# Patient Record
Sex: MALE | Race: WHITE
[De-identification: names, ages, dates, MRNs, and addresses within clinical notes are randomized per-mention and may not be internally consistent; named-entity substitution may affect disease eponyms.]

---

## 2017-03-17 ENCOUNTER — HOSPITAL ENCOUNTER (OUTPATIENT)
Age: 62
Discharge: HOME | End: 2017-03-17
Payer: OTHER GOVERNMENT

## 2017-03-17 DIAGNOSIS — H34.211: Primary | ICD-10-CM

## 2018-02-27 ENCOUNTER — HOSPITAL ENCOUNTER (OUTPATIENT)
Dept: HOSPITAL 76 - LAB.N | Age: 63
Discharge: HOME | End: 2018-02-27
Attending: INTERNAL MEDICINE
Payer: OTHER GOVERNMENT

## 2018-02-27 DIAGNOSIS — H40.9: ICD-10-CM

## 2018-02-27 DIAGNOSIS — E11.40: ICD-10-CM

## 2018-02-27 DIAGNOSIS — I10: ICD-10-CM

## 2018-02-27 DIAGNOSIS — K59.00: ICD-10-CM

## 2018-02-27 DIAGNOSIS — M19.90: ICD-10-CM

## 2018-02-27 DIAGNOSIS — Z13.6: ICD-10-CM

## 2018-02-27 DIAGNOSIS — Z00.00: Primary | ICD-10-CM

## 2018-02-27 DIAGNOSIS — Z79.899: ICD-10-CM

## 2018-02-27 LAB
ALBUMIN DIAFP-MCNC: 3.7 G/DL (ref 3.2–5.5)
ALBUMIN/GLOB SERPL: 1.1 {RATIO} (ref 1–2.2)
ALP SERPL-CCNC: 65 IU/L (ref 42–121)
ALT SERPL W P-5'-P-CCNC: 18 IU/L (ref 10–60)
ANION GAP SERPL CALCULATED.4IONS-SCNC: 9 MMOL/L (ref 6–13)
AST SERPL W P-5'-P-CCNC: 22 IU/L (ref 10–42)
BASOPHILS NFR BLD AUTO: 0.2 10^3/UL (ref 0–0.1)
BASOPHILS NFR BLD AUTO: 1.8 %
BILIRUB BLD-MCNC: 1 MG/DL (ref 0.2–1)
BUN SERPL-MCNC: 18 MG/DL (ref 6–20)
CALCIUM UR-MCNC: 9.2 MG/DL (ref 8.5–10.3)
CHLORIDE SERPL-SCNC: 102 MMOL/L (ref 101–111)
CHOLEST SERPL-MCNC: 222 MG/DL
CLARITY UR REFRACT.AUTO: CLEAR
CO2 SERPL-SCNC: 23 MMOL/L (ref 21–32)
CREAT SERPLBLD-SCNC: 1.1 MG/DL (ref 0.6–1.2)
CREAT UR-SCNC: 167 MG/DL
EOSINOPHIL # BLD AUTO: 0.3 10^3/UL (ref 0–0.7)
EOSINOPHIL NFR BLD AUTO: 3 %
ERYTHROCYTE [DISTWIDTH] IN BLOOD BY AUTOMATED COUNT: 13.5 % (ref 12–15)
EST. AVERAGE GLUCOSE BLD GHB EST-MCNC: 151 MG/DL (ref 70–100)
GFRSERPLBLD MDRD-ARVRAT: 67 ML/MIN/{1.73_M2} (ref 89–?)
GLOBULIN SER-MCNC: 3.4 G/DL (ref 2.1–4.2)
GLUCOSE SERPL-MCNC: 279 MG/DL (ref 70–100)
GLUCOSE UR QL STRIP.AUTO: >=1000 MG/DL
HB2 TOTAL: 17.9 G/DL
HBA1C BLD-MCNC: 0.92 G/DL
HDLC SERPL-MCNC: 37 MG/DL
HDLC SERPL: 6 {RATIO} (ref ?–5)
HEMOGLOBIN A1C %: 6.9 % (ref 4.6–6.2)
HGB UR QL STRIP: 15.7 G/DL (ref 14–18)
KETONES UR QL STRIP.AUTO: NEGATIVE MG/DL
LDLC SERPL CALC-MCNC: 163 MG/DL
LDLC/HDLC SERPL: 4.4 {RATIO} (ref ?–3.6)
LYMPHOCYTES # SPEC AUTO: 2.5 10^3/UL (ref 1.5–3.5)
LYMPHOCYTES NFR BLD AUTO: 26.1 %
MCH RBC QN AUTO: 28.7 PG (ref 27–31)
MCHC RBC AUTO-ENTMCNC: 33.8 G/DL (ref 32–36)
MCV RBC AUTO: 84.9 FL (ref 80–94)
MICROALBUMIN UR-MCNC: 0.3 MG/DL (ref 0–300)
MICROALBUMIN/CREAT RATIO PNL UR: 1.8 UG/MG (ref ?–30)
MONOCYTES # BLD AUTO: 0.7 10^3/UL (ref 0–1)
MONOCYTES NFR BLD AUTO: 6.9 %
NEUTROPHILS # BLD AUTO: 6 10^3/UL (ref 1.5–6.6)
NEUTROPHILS # SNV AUTO: 9.7 X10^3/UL (ref 4.8–10.8)
NEUTROPHILS NFR BLD AUTO: 62.2 %
NITRITE UR QL STRIP.AUTO: NEGATIVE
PDW BLD AUTO: 9.2 FL (ref 7.4–11.4)
PH UR STRIP.AUTO: 6 PH (ref 5–7.5)
PLATELET # BLD: 244 10^3/UL (ref 130–450)
PROT SPEC-MCNC: 7.1 G/DL (ref 6.7–8.2)
PROT UR STRIP.AUTO-MCNC: NEGATIVE MG/DL
RBC # UR STRIP.AUTO: NEGATIVE /UL
RBC MAR: 5.46 10^6/UL (ref 4.7–6.1)
SODIUM SERPLBLD-SCNC: 134 MMOL/L (ref 135–145)
SP GR UR STRIP.AUTO: 1.02 (ref 1–1.03)
TSH SERPL-ACNC: 2.43 UIU/ML (ref 0.34–5.6)
UROBILINOGEN UR QL STRIP.AUTO: (no result) E.U./DL
UROBILINOGEN UR STRIP.AUTO-MCNC: NEGATIVE MG/DL
VIT B12 SERPL-MCNC: 546 PG/ML (ref 180–914)
VLDLC SERPL-SCNC: 22 MG/DL

## 2018-02-27 PROCEDURE — 81001 URINALYSIS AUTO W/SCOPE: CPT

## 2018-02-27 PROCEDURE — 82607 VITAMIN B-12: CPT

## 2018-02-27 PROCEDURE — 80061 LIPID PANEL: CPT

## 2018-02-27 PROCEDURE — 83036 HEMOGLOBIN GLYCOSYLATED A1C: CPT

## 2018-02-27 PROCEDURE — 82043 UR ALBUMIN QUANTITATIVE: CPT

## 2018-02-27 PROCEDURE — 36415 COLL VENOUS BLD VENIPUNCTURE: CPT

## 2018-02-27 PROCEDURE — 80053 COMPREHEN METABOLIC PANEL: CPT

## 2018-02-27 PROCEDURE — 81003 URINALYSIS AUTO W/O SCOPE: CPT

## 2018-02-27 PROCEDURE — 83721 ASSAY OF BLOOD LIPOPROTEIN: CPT

## 2018-02-27 PROCEDURE — 84443 ASSAY THYROID STIM HORMONE: CPT

## 2018-02-27 PROCEDURE — 86803 HEPATITIS C AB TEST: CPT

## 2018-02-27 PROCEDURE — 82570 ASSAY OF URINE CREATININE: CPT

## 2018-02-27 PROCEDURE — 85025 COMPLETE CBC W/AUTO DIFF WBC: CPT

## 2018-02-27 PROCEDURE — 87086 URINE CULTURE/COLONY COUNT: CPT

## 2018-02-28 LAB
HEPATITIS C ANTIBODY: (no result)
SIGNAL TO CUT-OFF: 0 (ref ?–1)

## 2019-04-09 ENCOUNTER — HOSPITAL ENCOUNTER (OUTPATIENT)
Dept: HOSPITAL 76 - LAB.N | Age: 64
Discharge: HOME | End: 2019-04-09
Attending: INTERNAL MEDICINE
Payer: OTHER GOVERNMENT

## 2019-04-09 DIAGNOSIS — C85.90: ICD-10-CM

## 2019-04-09 DIAGNOSIS — I10: ICD-10-CM

## 2019-04-09 DIAGNOSIS — G62.9: ICD-10-CM

## 2019-04-09 DIAGNOSIS — H40.9: ICD-10-CM

## 2019-04-09 DIAGNOSIS — Z13.6: ICD-10-CM

## 2019-04-09 DIAGNOSIS — M19.90: ICD-10-CM

## 2019-04-09 DIAGNOSIS — H91.90: ICD-10-CM

## 2019-04-09 DIAGNOSIS — E11.9: ICD-10-CM

## 2019-04-09 DIAGNOSIS — Z79.899: Primary | ICD-10-CM

## 2019-04-09 LAB
ALBUMIN DIAFP-MCNC: 3.9 G/DL (ref 3.2–5.5)
ALBUMIN/GLOB SERPL: 1.2 {RATIO} (ref 1–2.2)
ALP SERPL-CCNC: 78 IU/L (ref 42–121)
ALT SERPL W P-5'-P-CCNC: 17 IU/L (ref 10–60)
ANION GAP SERPL CALCULATED.4IONS-SCNC: 8 MMOL/L (ref 6–13)
AST SERPL W P-5'-P-CCNC: 20 IU/L (ref 10–42)
BASOPHILS NFR BLD AUTO: 0.1 10^3/UL (ref 0–0.1)
BASOPHILS NFR BLD AUTO: 1 %
BILIRUB BLD-MCNC: 0.7 MG/DL (ref 0.2–1)
BUN SERPL-MCNC: 17 MG/DL (ref 6–20)
CALCIUM UR-MCNC: 10.1 MG/DL (ref 8.5–10.3)
CHLORIDE SERPL-SCNC: 103 MMOL/L (ref 101–111)
CHOLEST SERPL-MCNC: 205 MG/DL
CLARITY UR REFRACT.AUTO: CLEAR
CO2 SERPL-SCNC: 26 MMOL/L (ref 21–32)
CREAT SERPLBLD-SCNC: 0.8 MG/DL (ref 0.6–1.2)
CREAT UR-SCNC: 135.5 MG/DL
EOSINOPHIL # BLD AUTO: 0.2 10^3/UL (ref 0–0.7)
EOSINOPHIL NFR BLD AUTO: 1.6 %
ERYTHROCYTE [DISTWIDTH] IN BLOOD BY AUTOMATED COUNT: 13.4 % (ref 12–15)
EST. AVERAGE GLUCOSE BLD GHB EST-MCNC: 137 MG/DL (ref 70–100)
GFRSERPLBLD MDRD-ARVRAT: 97 ML/MIN/{1.73_M2} (ref 89–?)
GLOBULIN SER-MCNC: 3.2 G/DL (ref 2.1–4.2)
GLUCOSE SERPL-MCNC: 146 MG/DL (ref 70–100)
GLUCOSE UR QL STRIP.AUTO: 250 MG/DL
HB2 TOTAL: 17.7 G/DL
HBA1C BLD-MCNC: 0.83 G/DL
HDLC SERPL-MCNC: 39 MG/DL
HDLC SERPL: 5.3 {RATIO} (ref ?–5)
HEMOGLOBIN A1C %: 6.4 % (ref 4.6–6.2)
HGB UR QL STRIP: 15.8 G/DL (ref 14–18)
KETONES UR QL STRIP.AUTO: NEGATIVE MG/DL
LDLC SERPL CALC-MCNC: 114 MG/DL
LDLC/HDLC SERPL: 2.9 {RATIO} (ref ?–3.6)
LYMPHOCYTES # SPEC AUTO: 2.3 10^3/UL (ref 1.5–3.5)
LYMPHOCYTES NFR BLD AUTO: 23.2 %
MCH RBC QN AUTO: 28.6 PG (ref 27–31)
MCHC RBC AUTO-ENTMCNC: 34 G/DL (ref 32–36)
MCV RBC AUTO: 83.9 FL (ref 80–94)
MICROALBUMIN UR-MCNC: 0.9 MG/DL (ref 0–300)
MICROALBUMIN/CREAT RATIO PNL UR: 6.6 UG/MG (ref ?–30)
MONOCYTES # BLD AUTO: 0.8 10^3/UL (ref 0–1)
MONOCYTES NFR BLD AUTO: 8.2 %
NEUTROPHILS # BLD AUTO: 6.4 10^3/UL (ref 1.5–6.6)
NEUTROPHILS # SNV AUTO: 9.8 X10^3/UL (ref 4.8–10.8)
NEUTROPHILS NFR BLD AUTO: 66 %
NITRITE UR QL STRIP.AUTO: NEGATIVE
PDW BLD AUTO: 9 FL (ref 7.4–11.4)
PH UR STRIP.AUTO: 7 PH (ref 5–7.5)
PLATELET # BLD: 299 10^3/UL (ref 130–450)
PROT SPEC-MCNC: 7.1 G/DL (ref 6.7–8.2)
PROT UR STRIP.AUTO-MCNC: NEGATIVE MG/DL
RBC # UR STRIP.AUTO: NEGATIVE /UL
RBC MAR: 5.55 10^6/UL (ref 4.7–6.1)
SODIUM SERPLBLD-SCNC: 137 MMOL/L (ref 135–145)
SP GR UR STRIP.AUTO: 1.01 (ref 1–1.03)
TSH SERPL-ACNC: 1.75 UIU/ML (ref 0.34–5.6)
UROBILINOGEN UR QL STRIP.AUTO: (no result) E.U./DL
UROBILINOGEN UR STRIP.AUTO-MCNC: NEGATIVE MG/DL
VIT B12 SERPL-MCNC: 460 PG/ML (ref 180–914)
VLDLC SERPL-SCNC: 52 MG/DL

## 2019-04-09 PROCEDURE — 80061 LIPID PANEL: CPT

## 2019-04-09 PROCEDURE — 87086 URINE CULTURE/COLONY COUNT: CPT

## 2019-04-09 PROCEDURE — 81001 URINALYSIS AUTO W/SCOPE: CPT

## 2019-04-09 PROCEDURE — 82607 VITAMIN B-12: CPT

## 2019-04-09 PROCEDURE — 36415 COLL VENOUS BLD VENIPUNCTURE: CPT

## 2019-04-09 PROCEDURE — 83036 HEMOGLOBIN GLYCOSYLATED A1C: CPT

## 2019-04-09 PROCEDURE — 84443 ASSAY THYROID STIM HORMONE: CPT

## 2019-04-09 PROCEDURE — 81003 URINALYSIS AUTO W/O SCOPE: CPT

## 2019-04-09 PROCEDURE — 83721 ASSAY OF BLOOD LIPOPROTEIN: CPT

## 2019-04-09 PROCEDURE — 85025 COMPLETE CBC W/AUTO DIFF WBC: CPT

## 2019-04-09 PROCEDURE — 82043 UR ALBUMIN QUANTITATIVE: CPT

## 2019-04-09 PROCEDURE — 82570 ASSAY OF URINE CREATININE: CPT

## 2019-04-09 PROCEDURE — 80053 COMPREHEN METABOLIC PANEL: CPT

## 2020-05-19 ENCOUNTER — HOSPITAL ENCOUNTER (OUTPATIENT)
Dept: HOSPITAL 76 - COV | Age: 65
Discharge: HOME | End: 2020-05-19
Attending: OPHTHALMOLOGY
Payer: OTHER GOVERNMENT

## 2020-05-19 DIAGNOSIS — Z01.812: Primary | ICD-10-CM

## 2020-05-19 DIAGNOSIS — H25.11: ICD-10-CM

## 2020-05-19 PROCEDURE — 81599 UNLISTED MAAA: CPT

## 2020-05-21 ENCOUNTER — HOSPITAL ENCOUNTER (OUTPATIENT)
Dept: HOSPITAL 76 - SDS | Age: 65
Discharge: HOME | End: 2020-05-21
Attending: OPHTHALMOLOGY
Payer: OTHER GOVERNMENT

## 2020-05-21 VITALS — SYSTOLIC BLOOD PRESSURE: 137 MMHG | DIASTOLIC BLOOD PRESSURE: 77 MMHG

## 2020-05-21 DIAGNOSIS — I10: ICD-10-CM

## 2020-05-21 DIAGNOSIS — H25.11: ICD-10-CM

## 2020-05-21 DIAGNOSIS — R06.83: ICD-10-CM

## 2020-05-21 DIAGNOSIS — E11.36: Primary | ICD-10-CM

## 2020-05-21 DIAGNOSIS — Z79.4: ICD-10-CM

## 2020-05-21 PROCEDURE — 08RJ3JZ REPLACEMENT OF RIGHT LENS WITH SYNTHETIC SUBSTITUTE, PERCUTANEOUS APPROACH: ICD-10-PCS | Performed by: OPHTHALMOLOGY

## 2020-05-21 PROCEDURE — 66984 XCAPSL CTRC RMVL W/O ECP: CPT

## 2020-05-21 NOTE — ANESTHESIA
Pre-Anesthesia VS, & Labs





- Diagnosis





right eye nuclear sclerotic cataract





- Procedure





cataract extraction with probable intraocular lens implant right eye


Vital Signs: 





                                        











Temp Pulse Resp BP Pulse Ox


 


 36.8 C   111 H  22   175/97 H  96 


 


 05/21/20 07:11  05/21/20 07:11  05/21/20 07:11  05/21/20 07:11  05/21/20 07:11














                                        





Height                           6 ft 3 in


Weight (kg)                      133 kg


Body Mass Index                  33.0











- NPO


>8 hours





- Lab Results


Current Lab Results: 





Laboratory Tests





05/21/20 07:44: POC Whole Bld Glucose 206 H








Lab results reviewed: Yes





Home Medications and Allergies


Home Medications: 


Ambulatory Orders





Hydrochlorothiazide 12.5 mg PO DAILY 05/20/20 











                                        





Insulin NPH Human [NovoLIN N] 56 units SQ DAILY 12/08/18 


Insulin Regular Human [NovoLIN R] 56 units SQ DAILY 12/08/18 


Lisinopril/Hydrochlorothiazide [Lisinopril-Hctz 10-12.5 mg Tab] 1 tab PO DAILY 

12/08/18 


Multivitamin [Multivitamins] 1 tab PO DAILY 12/08/18 


Vit B Cplx C No.13/Folic AC/D3 [Nephrocaps Qt Tablet] 1 tab PO DAILY 12/08/18 


Hydrochlorothiazide 12.5 mg PO DAILY 05/20/20 








Allergies/Adverse Reactions: 


                                    Allergies











Allergy/AdvReac Type Severity Reaction Status Date / Time


 


nut - unspecified Allergy  Anaphylaxis Verified 12/08/18 10:26














Anes History & Medical History





- Anesthetic History


Anesthesia Complications: reports: No previous complications





- Medical History


Cardiovascular: reports: Hypertension


Pulmonary: reports: None, Other (snores at night)


Gastrointestinal: reports: None


Urinary: reports: None


Neuro: reports: None


Musculoskeletal: reports: None


Endocrine/Autoimmune: reports: Type 2 diabetes


Skin: reports: None


Smoking Status: Never smoker


Psychosocial: reports: No issues indicated


Other Past Medical History: Lymphoma 40 years ago. Treated with cobalt therapy. 

Also bleeds easily, can not take aspirin





- Surgical History


General: Other (lymphnode biopsy)





Exam


General: Alert, Oriented x3, Cooperative, No acute distress


Dental: WNL


Mouth Opening: 3 Fingerbreadth


Neck Mobility: Normal


Mallampati classification: II


Thyromental Distance: greater than 6 cm


Mental/Cognitive Status: Alert/Oriented X3, Normal for patient





Plan


Anesthesia Type: MAC


Consent for Procedure(s) Verified and Reviewed: Yes


Code Status: Attempt Resuscitation


ASA classification: 2-Mild systemic disease


Is this case an emergency?: No

## 2020-05-21 NOTE — OPERATIVE REPORT
DATE OF SERVICE: 05/21/2020

Physician: Chaitanya Morales MD

 

PREOPERATIVE DIAGNOSIS:  Visually significant cataract, right eye.  This was his
first cataract surgery.

 

POSTOPERATIVE DIAGNOSIS:  Visually significant cataract, right eye.  This was 
his first cataract surgery.

 

DESCRIPTION OF PROCEDURE:  Phacoemulsification with posterior chamber 
intraocular lens implant, right eye.

 

SURGEON:  Chaitanya Morales MD

 

ANESTHESIA:  Monitored anesthesia care.

 

COMPLICATIONS:  None.

 

OPERATIVE INDICATIONS:  This is a 65-year-old man with progressive vision loss 
in the right eye due to 4+ nuclear sclerotic cataract.  Best corrected visual 
acuity was 20/30, with glare to hand motion vision in the right eye.  He was 
consented at length concerning risks and benefits of cataract surgery, after 
which he expressed a desire to proceed with surgery.

 

OPERATIVE PROCEDURE:  The patient was taken to OR #3 and placed under monitored 
anesthesia care.  A surgical timeout was conducted confirming correct patient, 
correct procedure, and correct surgical site.  He was given topical anesthesia, 
and prepped and draped in the usual sterile fashion.  The eye was entered at the
12 and 9 o'clock positions.  Intracameral Shugarcaine was injected into the 
anterior chamber, followed by Viscoat.  A continuous-tear curvilinear 
capsulorrhexis was performed.  The nucleus was hydrodissected and 
phacoemulsified.  The cortex was evacuated using automated infusion and 
aspiration.  Provisc was injected in the capsular bag and a 16.5 diopter 
intraocular lens was inserted into the bag.  Infusion and aspiration was used to
evacuate the viscoelastic materials.  The eye was inflated to physiologic 
pressure using balanced salt solution and found to be watertight.  Approximately
0.25 mL of a mixture of triamcinolone and  moxifloxacin was injected 
transsclerally into the vitreous in the inferotemporal quadrant.  An additional 
0.55 mL mixture of triamcinolone, moxifloxacin, and vancomycin was injected 
subconjunctivally in the superior quadrant for infection and inflammation 
prophylaxis.  Wound integrity was checked with Weck-Evelyn sponges.  Patient was 
taken from the operating room in good condition and given postoperative 
instructions.

 

 

DD: 05/21/2020 09:05

TD: 05/21/2020 09:08

Job #: [id number]

[facility]